# Patient Record
Sex: FEMALE | Race: BLACK OR AFRICAN AMERICAN | NOT HISPANIC OR LATINO
[De-identification: names, ages, dates, MRNs, and addresses within clinical notes are randomized per-mention and may not be internally consistent; named-entity substitution may affect disease eponyms.]

---

## 2023-05-12 PROBLEM — Z00.00 ENCOUNTER FOR PREVENTIVE HEALTH EXAMINATION: Status: ACTIVE | Noted: 2023-05-12

## 2023-06-08 ENCOUNTER — NON-APPOINTMENT (OUTPATIENT)
Age: 43
End: 2023-06-08

## 2023-06-12 ENCOUNTER — APPOINTMENT (OUTPATIENT)
Dept: OBGYN | Facility: CLINIC | Age: 43
End: 2023-06-12

## 2023-09-29 ENCOUNTER — TRANSCRIPTION ENCOUNTER (OUTPATIENT)
Age: 43
End: 2023-09-29

## 2024-03-21 ENCOUNTER — TRANSCRIPTION ENCOUNTER (OUTPATIENT)
Age: 44
End: 2024-03-21

## 2024-09-16 ENCOUNTER — APPOINTMENT (OUTPATIENT)
Dept: ORTHOPEDIC SURGERY | Facility: CLINIC | Age: 44
End: 2024-09-16
Payer: OTHER MISCELLANEOUS

## 2024-09-16 VITALS — BODY MASS INDEX: 36.44 KG/M2 | WEIGHT: 193 LBS | HEIGHT: 61 IN

## 2024-09-16 DIAGNOSIS — S80.02XA CONTUSION OF LEFT KNEE, INITIAL ENCOUNTER: ICD-10-CM

## 2024-09-16 PROCEDURE — 99204 OFFICE O/P NEW MOD 45 MIN: CPT

## 2024-09-16 PROCEDURE — 73564 X-RAY EXAM KNEE 4 OR MORE: CPT | Mod: LT

## 2024-09-25 NOTE — HISTORY OF PRESENT ILLNESS
[de-identified] : CISCO is a 44 year female here today for initial visit due to L Knee. This is a workers comp injury. Date of injury was 08/29/2024. 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 Endorses anterior and posterior knee pain. She also reports calf pain. States she initially was unable to bend her leg. Now, she is able to bend her leg however has some swelling. She is still working at this time. Of note, patient only has one kidney and is unable to take anti-inflammatories.

## 2024-09-25 NOTE — HISTORY OF PRESENT ILLNESS
[de-identified] : CISCO is a 44 year female here today for initial visit due to L Knee. This is a workers comp injury. Date of injury was 08/29/2024. 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 Endorses anterior and posterior knee pain. She also reports calf pain. States she initially was unable to bend her leg. Now, she is able to bend her leg however has some swelling. She is still working at this time. Of note, patient only has one kidney and is unable to take anti-inflammatories.

## 2024-09-25 NOTE — DISCUSSION/SUMMARY
[de-identified] : We had a thorough discussion regarding the nature of her pain, the pathophysiology, as well as all treatment options. I discussed operative and non-operative treatment modalities. Will provide patient with a brace for support. Considering the patient's current presentation of pain, physical exam, and radiographs an MRI is indicated at this time. A prescription for this was given to the patient. We will go over the MRI results with her upon obtaining the results in the office and advise her of further treatment options. She agrees with the above plan and all questions were answered.

## 2024-09-25 NOTE — HISTORY OF PRESENT ILLNESS
[de-identified] : CISCO is a 44 year female here today for initial visit due to L Knee. This is a workers comp injury. Date of injury was 08/29/2024. 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 Endorses anterior and posterior knee pain. She also reports calf pain. States she initially was unable to bend her leg. Now, she is able to bend her leg however has some swelling. She is still working at this time. Of note, patient only has one kidney and is unable to take anti-inflammatories.

## 2024-09-25 NOTE — PHYSICAL EXAM
[de-identified] : General: Well appearing, no acute distress Neurologic: A&Ox3, No focal deficits Head: NCAT without abrasions, lacerations, or ecchymosis to head, face, or scalp Eyes: No scleral icterus, no gross abnormalities Respiratory: Equal chest wall expansion bilaterally, no accessory muscle use Lymphatic: No lymphadenopathy palpated Skin: Warm and dry Psychiatric: Normal mood and affect  The left knee is examined and reveals no swelling, ecchymosis, erythema, or deformity. The patients range of motion is from 0-130 degrees pain free and fluid. There is no crepitus felt. Tenderness over hamstring muscles. The patient has no tenderness to palpation in the medial or lateral joint lines. There is no patellar facet tenderness. The patient has 5/5 strength to resisted hip flexion, VMO isolation, knee flexion and extension. The patient is stable to anterior and posterior draw. The patient is stable to Lochman testing. There is no valgus or varus ligamentous instability. Negative Karen and Grind tests. There is no pain with patellar mobility or apprehension testing. The calf and thigh are soft, supple, and nontender. The patient is grossly neurovascularly intact distally. [de-identified] : The following radiographs were ordered and read by me during this patient's visit. I reviewed each radiograph in detail with the patient and discussed the findings as highlighted below. 4 views of the left knee were obtained today that show no fracture, dislocation. There is no degenerative change seen. There is no malalignment. No obvious osseous abnormality. Soft tissue shadowing consistent with patellar bursitis.

## 2024-09-25 NOTE — PHYSICAL EXAM
[de-identified] : General: Well appearing, no acute distress Neurologic: A&Ox3, No focal deficits Head: NCAT without abrasions, lacerations, or ecchymosis to head, face, or scalp Eyes: No scleral icterus, no gross abnormalities Respiratory: Equal chest wall expansion bilaterally, no accessory muscle use Lymphatic: No lymphadenopathy palpated Skin: Warm and dry Psychiatric: Normal mood and affect  The left knee is examined and reveals no swelling, ecchymosis, erythema, or deformity. The patients range of motion is from 0-130 degrees pain free and fluid. There is no crepitus felt. Tenderness over hamstring muscles. The patient has no tenderness to palpation in the medial or lateral joint lines. There is no patellar facet tenderness. The patient has 5/5 strength to resisted hip flexion, VMO isolation, knee flexion and extension. The patient is stable to anterior and posterior draw. The patient is stable to Lochman testing. There is no valgus or varus ligamentous instability. Negative Karen and Grind tests. There is no pain with patellar mobility or apprehension testing. The calf and thigh are soft, supple, and nontender. The patient is grossly neurovascularly intact distally. [de-identified] : The following radiographs were ordered and read by me during this patient's visit. I reviewed each radiograph in detail with the patient and discussed the findings as highlighted below. 4 views of the left knee were obtained today that show no fracture, dislocation. There is no degenerative change seen. There is no malalignment. No obvious osseous abnormality. Soft tissue shadowing consistent with patellar bursitis.

## 2024-09-25 NOTE — DISCUSSION/SUMMARY
[de-identified] : We had a thorough discussion regarding the nature of her pain, the pathophysiology, as well as all treatment options. I discussed operative and non-operative treatment modalities. Will provide patient with a brace for support. Considering the patient's current presentation of pain, physical exam, and radiographs an MRI is indicated at this time. A prescription for this was given to the patient. We will go over the MRI results with her upon obtaining the results in the office and advise her of further treatment options. She agrees with the above plan and all questions were answered.

## 2024-09-25 NOTE — DISCUSSION/SUMMARY
[de-identified] : We had a thorough discussion regarding the nature of her pain, the pathophysiology, as well as all treatment options. I discussed operative and non-operative treatment modalities. Will provide patient with a brace for support. Considering the patient's current presentation of pain, physical exam, and radiographs an MRI is indicated at this time. A prescription for this was given to the patient. We will go over the MRI results with her upon obtaining the results in the office and advise her of further treatment options. She agrees with the above plan and all questions were answered.

## 2024-09-25 NOTE — CONSULT LETTER
[Dear  ___] : Dear  [unfilled], [Consult Letter:] : I had the pleasure of evaluating your patient, [unfilled]. [Please see my note below.] : Please see my note below. [Sincerely,] : Sincerely, [FreeTextEntry3] : Dr. Ronak Scales

## 2024-09-25 NOTE — ADDENDUM
[FreeTextEntry1] : Documented by Nelly Zambrano acting as a scribe for Dr. Scales and Andriy Hollingsworth PA-C on 09/16/2024. All medical record entries made by the Scribe were at my, Dr. Scales, and Andriy Hollingsworth's, direction and personally dictated by me on 09/16/2024. I have reviewed the chart and agree that the record accurately reflects my personal performance of the history, physical exam, procedure and imaging.

## 2024-09-25 NOTE — PHYSICAL EXAM
[de-identified] : General: Well appearing, no acute distress Neurologic: A&Ox3, No focal deficits Head: NCAT without abrasions, lacerations, or ecchymosis to head, face, or scalp Eyes: No scleral icterus, no gross abnormalities Respiratory: Equal chest wall expansion bilaterally, no accessory muscle use Lymphatic: No lymphadenopathy palpated Skin: Warm and dry Psychiatric: Normal mood and affect  The left knee is examined and reveals no swelling, ecchymosis, erythema, or deformity. The patients range of motion is from 0-130 degrees pain free and fluid. There is no crepitus felt. Tenderness over hamstring muscles. The patient has no tenderness to palpation in the medial or lateral joint lines. There is no patellar facet tenderness. The patient has 5/5 strength to resisted hip flexion, VMO isolation, knee flexion and extension. The patient is stable to anterior and posterior draw. The patient is stable to Lochman testing. There is no valgus or varus ligamentous instability. Negative Karen and Grind tests. There is no pain with patellar mobility or apprehension testing. The calf and thigh are soft, supple, and nontender. The patient is grossly neurovascularly intact distally. [de-identified] : The following radiographs were ordered and read by me during this patient's visit. I reviewed each radiograph in detail with the patient and discussed the findings as highlighted below. 4 views of the left knee were obtained today that show no fracture, dislocation. There is no degenerative change seen. There is no malalignment. No obvious osseous abnormality. Soft tissue shadowing consistent with patellar bursitis.

## 2024-09-25 NOTE — REASON FOR VISIT
[Initial Visit] : an initial visit for [Workers' Comp: Date of Injury: _______] : This visit is related to worker's compensation. Date of Injury: [unfilled] [Knee Pain] : knee pain [FreeTextEntry2] : L knee

## 2024-10-07 ENCOUNTER — APPOINTMENT (OUTPATIENT)
Dept: ORTHOPEDIC SURGERY | Facility: CLINIC | Age: 44
End: 2024-10-07
Payer: OTHER MISCELLANEOUS

## 2024-10-07 VITALS — WEIGHT: 193 LBS | HEIGHT: 61 IN | BODY MASS INDEX: 36.44 KG/M2

## 2024-10-07 DIAGNOSIS — S83.242D OTHER TEAR OF MEDIAL MENISCUS, CURRENT INJURY, LEFT KNEE, SUBSEQUENT ENCOUNTER: ICD-10-CM

## 2024-10-07 PROCEDURE — 99214 OFFICE O/P EST MOD 30 MIN: CPT

## 2024-11-04 ENCOUNTER — APPOINTMENT (OUTPATIENT)
Dept: ORTHOPEDIC SURGERY | Facility: CLINIC | Age: 44
End: 2024-11-04

## 2025-07-07 ENCOUNTER — NON-APPOINTMENT (OUTPATIENT)
Age: 45
End: 2025-07-07